# Patient Record
Sex: FEMALE | Race: ASIAN | NOT HISPANIC OR LATINO
[De-identification: names, ages, dates, MRNs, and addresses within clinical notes are randomized per-mention and may not be internally consistent; named-entity substitution may affect disease eponyms.]

---

## 2017-03-02 ENCOUNTER — APPOINTMENT (OUTPATIENT)
Dept: ENDOCRINOLOGY | Facility: CLINIC | Age: 22
End: 2017-03-02

## 2017-04-24 ENCOUNTER — MESSAGE (OUTPATIENT)
Age: 22
End: 2017-04-24

## 2017-04-26 ENCOUNTER — APPOINTMENT (OUTPATIENT)
Dept: DERMATOLOGY | Facility: CLINIC | Age: 22
End: 2017-04-26

## 2017-04-26 ENCOUNTER — APPOINTMENT (OUTPATIENT)
Dept: ENDOCRINOLOGY | Facility: CLINIC | Age: 22
End: 2017-04-26

## 2017-04-26 VITALS — SYSTOLIC BLOOD PRESSURE: 114 MMHG | DIASTOLIC BLOOD PRESSURE: 78 MMHG

## 2017-04-26 VITALS — HEART RATE: 71 BPM | OXYGEN SATURATION: 98 % | BODY MASS INDEX: 22.78 KG/M2 | WEIGHT: 116 LBS | HEIGHT: 60 IN

## 2017-04-26 VITALS — DIASTOLIC BLOOD PRESSURE: 80 MMHG | SYSTOLIC BLOOD PRESSURE: 122 MMHG

## 2017-04-26 DIAGNOSIS — G43.909 MIGRAINE, UNSPECIFIED, NOT INTRACTABLE, W/OUT STATUS MIGRAINOSUS: ICD-10-CM

## 2017-04-26 DIAGNOSIS — F41.8 OTHER SPECIFIED ANXIETY DISORDERS: ICD-10-CM

## 2017-05-04 ENCOUNTER — OTHER (OUTPATIENT)
Age: 22
End: 2017-05-04

## 2017-05-04 ENCOUNTER — APPOINTMENT (OUTPATIENT)
Dept: PHYSICAL MEDICINE AND REHAB | Facility: CLINIC | Age: 22
End: 2017-05-04

## 2017-05-04 VITALS
SYSTOLIC BLOOD PRESSURE: 105 MMHG | HEIGHT: 60 IN | OXYGEN SATURATION: 71 % | WEIGHT: 110 LBS | TEMPERATURE: 97.8 F | BODY MASS INDEX: 21.6 KG/M2 | HEART RATE: 71 BPM | DIASTOLIC BLOOD PRESSURE: 70 MMHG

## 2017-05-08 ENCOUNTER — TRANSCRIPTION ENCOUNTER (OUTPATIENT)
Age: 22
End: 2017-05-08

## 2017-05-30 ENCOUNTER — APPOINTMENT (OUTPATIENT)
Dept: DERMATOLOGY | Facility: CLINIC | Age: 22
End: 2017-05-30

## 2017-05-30 VITALS
SYSTOLIC BLOOD PRESSURE: 100 MMHG | DIASTOLIC BLOOD PRESSURE: 60 MMHG | HEIGHT: 60 IN | BODY MASS INDEX: 21.6 KG/M2 | WEIGHT: 110 LBS

## 2017-06-09 ENCOUNTER — APPOINTMENT (OUTPATIENT)
Dept: PHYSICAL MEDICINE AND REHAB | Facility: CLINIC | Age: 22
End: 2017-06-09

## 2017-07-11 ENCOUNTER — APPOINTMENT (OUTPATIENT)
Dept: DERMATOLOGY | Facility: CLINIC | Age: 22
End: 2017-07-11

## 2017-07-11 VITALS — SYSTOLIC BLOOD PRESSURE: 98 MMHG | DIASTOLIC BLOOD PRESSURE: 60 MMHG

## 2017-08-10 ENCOUNTER — RX RENEWAL (OUTPATIENT)
Age: 22
End: 2017-08-10

## 2017-08-22 ENCOUNTER — APPOINTMENT (OUTPATIENT)
Dept: DERMATOLOGY | Facility: CLINIC | Age: 22
End: 2017-08-22

## 2017-10-23 ENCOUNTER — APPOINTMENT (OUTPATIENT)
Dept: DERMATOLOGY | Facility: CLINIC | Age: 22
End: 2017-10-23

## 2017-12-23 ENCOUNTER — EMERGENCY (EMERGENCY)
Facility: HOSPITAL | Age: 22
LOS: 1 days | Discharge: HOME | End: 2017-12-23
Attending: EMERGENCY MEDICINE | Admitting: EMERGENCY MEDICINE
Payer: MEDICAID

## 2017-12-23 VITALS
RESPIRATION RATE: 17 BRPM | DIASTOLIC BLOOD PRESSURE: 84 MMHG | TEMPERATURE: 99 F | SYSTOLIC BLOOD PRESSURE: 122 MMHG | OXYGEN SATURATION: 100 % | HEART RATE: 66 BPM

## 2017-12-23 PROCEDURE — 99283 EMERGENCY DEPT VISIT LOW MDM: CPT

## 2017-12-23 RX ORDER — CHLORHEXIDINE GLUCONATE 213 G/1000ML
15 SOLUTION TOPICAL
Qty: 1 | Refills: 0 | OUTPATIENT
Start: 2017-12-23 | End: 2017-12-29

## 2017-12-23 RX ADMIN — Medication 1 TABLET(S): at 18:56

## 2017-12-23 NOTE — ED ADULT TRIAGE NOTE - CHIEF COMPLAINT QUOTE
Pt. c/o dog bite around 1pm today. Reports her dog bit her on the lower lip when she tried "giving her nose kisses". Pt. seen at Urgent care first, given tetanus shot and told to come to ER for possible sutures. No active bleeding noted.

## 2017-12-23 NOTE — ED PROVIDER NOTE - ATTENDING CONTRIBUTION TO CARE
MD Woodson:  patient seen and evaluated with the resident.  I was present for key portions of the History & Physical, and I agree with the Impression & Plan.  MD Woodson:  21 yo F, c/o lower lip lacerations from dog bite.  Dog belongs to family member and is vaccinated.  Location:  mucosal aspect of lip; she has no skin or vermillion border involvement.  VS - wnl.  Physical Exam: adult F, swollen lower lip, she has two vertically oriented mucosal-aspect lacerations that are not through-and-through.  No active bleeding.  Already received Tdap at urgent care.  Impression:  mucosal lip laceration that does not require sutures.  Plan:  peridex swish and spit, PO augmentin, return precautions.

## 2017-12-23 NOTE — ED PROVIDER NOTE - PLAN OF CARE
You were seen in the Emergency Department for a dog bite.   1) Advance activity as tolerated.    2) Continue all previously prescribed medications as directed.   your prescription of antibiotics and take as directed. Complete the full course of antibiotics. Use the swish and spit mouthwash twice a day for 7 days.  3) Follow up with your primary care physician in 24-48 hours - take copies of your results.    4) Return to the Emergency Department for worsening or persistent symptoms, and/or ANY NEW OR CONCERNING SYMPTOMS. If you have issues obtaining follow up, please call: [phone] to obtain a doctor or specialist who takes your insurance in your area.

## 2017-12-23 NOTE — ED PROVIDER NOTE - PHYSICAL EXAMINATION
Gen: AAOx3, non-toxic  Head: NCAT  HEENT: EOMI, oral mucosa moist, normal conjunctiva  Lung: CTAB, no respiratory distress, no wheezes/rhonchi/rales B/L, speaking in full sentences  CV: RRR, no murmurs, rubs or gallops  Abd: soft, NTND, no guarding  MSK: no visible deformities  Neuro: No focal sensory or motor deficits  Skin: Warm, well perfused, no rash, lower lip mucosal lacerations x 2, not actively bleeding, borders well approximated  Psych: normal affect.   ~Davidson Guadalupe M.D. Resident Gen: AAOx3, non-toxic  Head: NCAT  HEENT: EOMI, oral mucosa moist, normal conjunctiva  Lung: CTAB, no respiratory distress, no wheezes/rhonchi/rales B/L, speaking in full sentences  CV: RRR, no murmurs, rubs or gallops  Abd: soft, NTND, no guarding  MSK: no visible deformities  Neuro: No focal sensory or motor deficits  Skin: Warm, well perfused, no rash, lower lip mucosal lacerations x 2, not actively bleeding, borders well approximated, abrasion to chin  Psych: normal affect.   ~Davidson Guadalupe M.D. Resident

## 2017-12-23 NOTE — ED PROVIDER NOTE - NS ED ROS FT
GENERAL: No fever or chills, EYES: no change in vision, HEENT: no trouble swallowing or speaking, CARDIAC: no chest pain, PULMONARY: no cough or SOB, GI: no abdominal pain, no nausea, no vomiting, no diarrhea or constipation, : No changes in urination, SKIN: lower lip laceration, NEURO: no headache,  MSK: No joint pain ~Davidson Guadalupe M.D. Resident

## 2017-12-23 NOTE — ED PROVIDER NOTE - CARE PLAN
Principal Discharge DX:	Dog bite  Instructions for follow-up, activity and diet:	You were seen in the Emergency Department for a dog bite.   1) Advance activity as tolerated.    2) Continue all previously prescribed medications as directed.   your prescription of antibiotics and take as directed. Complete the full course of antibiotics. Use the swish and spit mouthwash twice a day for 7 days.  3) Follow up with your primary care physician in 24-48 hours - take copies of your results.    4) Return to the Emergency Department for worsening or persistent symptoms, and/or ANY NEW OR CONCERNING SYMPTOMS. If you have issues obtaining follow up, please call: [phone] to obtain a doctor or specialist who takes your insurance in your area.

## 2017-12-23 NOTE — ED PROVIDER NOTE - OBJECTIVE STATEMENT
21 yo F 23 yo F PMHx ALL p/w dog bite to lower lip around 1 pm. She was seen at Urgent Care and sent to the ER for possible suture repair. Her tetanus was updated at the Urgent Care Clinic. Bleeding has since stopped. Dog belongs to the family and is updated on all vaccines.

## 2018-01-29 ENCOUNTER — APPOINTMENT (OUTPATIENT)
Dept: DERMATOLOGY | Facility: CLINIC | Age: 23
End: 2018-01-29
Payer: MEDICAID

## 2018-01-29 ENCOUNTER — APPOINTMENT (OUTPATIENT)
Dept: PEDIATRIC HEMATOLOGY/ONCOLOGY | Facility: CLINIC | Age: 23
End: 2018-01-29
Payer: MEDICAID

## 2018-01-29 VITALS
BODY MASS INDEX: 23.67 KG/M2 | HEART RATE: 106 BPM | SYSTOLIC BLOOD PRESSURE: 105 MMHG | DIASTOLIC BLOOD PRESSURE: 70 MMHG | WEIGHT: 126.99 LBS | HEIGHT: 61.3 IN

## 2018-01-29 VITALS — SYSTOLIC BLOOD PRESSURE: 112 MMHG | DIASTOLIC BLOOD PRESSURE: 58 MMHG

## 2018-01-29 DIAGNOSIS — Z86.39 PERSONAL HISTORY OF OTHER ENDOCRINE, NUTRITIONAL AND METABOLIC DISEASE: ICD-10-CM

## 2018-01-29 DIAGNOSIS — Z85.9 PERSONAL HISTORY OF MALIGNANT NEOPLASM, UNSPECIFIED: ICD-10-CM

## 2018-01-29 DIAGNOSIS — E55.9 VITAMIN D DEFICIENCY, UNSPECIFIED: ICD-10-CM

## 2018-01-29 PROCEDURE — 11901 INJECT SKIN LESIONS >7: CPT

## 2018-01-29 PROCEDURE — 99215 OFFICE O/P EST HI 40 MIN: CPT

## 2018-01-29 RX ORDER — UBIDECARENONE/VIT E ACET 100MG-5
1000 CAPSULE ORAL DAILY
Refills: 0 | Status: DISCONTINUED | COMMUNITY
End: 2018-01-29

## 2018-01-29 RX ORDER — MELOXICAM 7.5 MG/1
7.5 TABLET ORAL
Qty: 30 | Refills: 0 | Status: DISCONTINUED | COMMUNITY
Start: 2017-05-04 | End: 2018-01-29

## 2018-01-29 RX ORDER — LEVOTHYROXINE SODIUM 0.1 MG/1
100 TABLET ORAL
Qty: 30 | Refills: 5 | Status: DISCONTINUED | COMMUNITY
Start: 2017-04-26 | End: 2018-01-29

## 2018-02-02 PROBLEM — Z86.39 HISTORY OF DIABETES MELLITUS: Status: RESOLVED | Noted: 2017-04-26 | Resolved: 2018-02-02

## 2018-02-02 PROBLEM — Z85.9 HISTORY OF MALIGNANT NEOPLASM: Status: RESOLVED | Noted: 2017-04-26 | Resolved: 2018-02-02

## 2018-02-02 PROBLEM — E55.9 VITAMIN D INSUFFICIENCY: Status: ACTIVE | Noted: 2018-01-29

## 2018-02-02 PROBLEM — Z86.39 HISTORY OF THYROID DISORDER: Status: RESOLVED | Noted: 2017-04-26 | Resolved: 2018-02-02

## 2018-04-07 ENCOUNTER — APPOINTMENT (OUTPATIENT)
Dept: DERMATOLOGY | Facility: CLINIC | Age: 23
End: 2018-04-07
Payer: MEDICAID

## 2018-04-07 VITALS — SYSTOLIC BLOOD PRESSURE: 100 MMHG | DIASTOLIC BLOOD PRESSURE: 64 MMHG

## 2018-04-07 PROCEDURE — 11901 INJECT SKIN LESIONS >7: CPT

## 2018-05-24 ENCOUNTER — RESULT REVIEW (OUTPATIENT)
Age: 23
End: 2018-05-24

## 2018-06-02 ENCOUNTER — APPOINTMENT (OUTPATIENT)
Dept: DERMATOLOGY | Facility: CLINIC | Age: 23
End: 2018-06-02
Payer: MEDICAID

## 2018-06-02 VITALS — SYSTOLIC BLOOD PRESSURE: 100 MMHG | DIASTOLIC BLOOD PRESSURE: 62 MMHG

## 2018-06-02 PROCEDURE — 11900 INJECT SKIN LESIONS </W 7: CPT

## 2018-06-02 RX ORDER — LEVOTHYROXINE SODIUM 112 UG/1
112 TABLET ORAL
Qty: 30 | Refills: 0 | Status: ACTIVE | COMMUNITY
Start: 2017-12-19

## 2019-10-02 ENCOUNTER — APPOINTMENT (OUTPATIENT)
Dept: PEDIATRIC HEMATOLOGY/ONCOLOGY | Facility: CLINIC | Age: 24
End: 2019-10-02
Payer: MEDICAID

## 2019-10-02 VITALS
BODY MASS INDEX: 25.27 KG/M2 | HEIGHT: 61.3 IN | HEART RATE: 69 BPM | SYSTOLIC BLOOD PRESSURE: 103 MMHG | WEIGHT: 135.58 LBS | DIASTOLIC BLOOD PRESSURE: 69 MMHG | TEMPERATURE: 98 F

## 2019-10-02 DIAGNOSIS — J30.2 OTHER SEASONAL ALLERGIC RHINITIS: ICD-10-CM

## 2019-10-02 DIAGNOSIS — Z78.9 OTHER SPECIFIED HEALTH STATUS: ICD-10-CM

## 2019-10-02 DIAGNOSIS — F12.90 CANNABIS USE, UNSPECIFIED, UNCOMPLICATED: ICD-10-CM

## 2019-10-02 DIAGNOSIS — L65.9 NONSCARRING HAIR LOSS, UNSPECIFIED: ICD-10-CM

## 2019-10-02 DIAGNOSIS — Z92.21 PERSONAL HISTORY OF ANTINEOPLASTIC CHEMOTHERAPY: ICD-10-CM

## 2019-10-02 DIAGNOSIS — Z87.2 PERSONAL HISTORY OF DISEASES OF THE SKIN AND SUBCUTANEOUS TISSUE: ICD-10-CM

## 2019-10-02 DIAGNOSIS — Z91.89 OTHER SPECIFIED PERSONAL RISK FACTORS, NOT ELSEWHERE CLASSIFIED: ICD-10-CM

## 2019-10-02 DIAGNOSIS — E89.0 POSTPROCEDURAL HYPOTHYROIDISM: ICD-10-CM

## 2019-10-02 DIAGNOSIS — Z08 ENCOUNTER FOR FOLLOW-UP EXAMINATION AFTER COMPLETED TREATMENT FOR MALIGNANT NEOPLASM: ICD-10-CM

## 2019-10-02 DIAGNOSIS — M25.531 PAIN IN RIGHT WRIST: ICD-10-CM

## 2019-10-02 DIAGNOSIS — Z85.6 PERSONAL HISTORY OF LEUKEMIA: ICD-10-CM

## 2019-10-02 PROCEDURE — 99215 OFFICE O/P EST HI 40 MIN: CPT

## 2019-10-02 RX ORDER — KETOTIFEN FUMARATE 0.35 MG/ML
0.03 SOLUTION/ DROPS OPHTHALMIC
Qty: 5 | Refills: 0 | Status: DISCONTINUED | COMMUNITY
Start: 2017-04-24 | End: 2019-10-02

## 2019-10-02 RX ORDER — ALENDRONATE SODIUM 70 MG/1
70 TABLET ORAL
Qty: 4 | Refills: 0 | Status: DISCONTINUED | COMMUNITY
Start: 2018-05-29 | End: 2019-10-02

## 2019-10-02 RX ORDER — DOCOSAHEXAENOIC ACID 300 MG
50 MCG CAPSULE ORAL
Qty: 30 | Refills: 0 | Status: DISCONTINUED | COMMUNITY
Start: 2018-04-26 | End: 2019-10-02

## 2019-10-02 RX ORDER — ERGOCALCIFEROL (VITAMIN D2) 1250 MCG
50000 CAPSULE ORAL
Refills: 0 | Status: DISCONTINUED | COMMUNITY
End: 2019-10-02

## 2019-10-02 RX ORDER — LEVOTHYROXINE SODIUM 137 UG/1
137 TABLET ORAL DAILY
Refills: 0 | Status: DISCONTINUED | COMMUNITY
End: 2019-10-02

## 2019-10-02 RX ORDER — FLUTICASONE PROPIONATE 50 UG/1
50 SPRAY, METERED NASAL
Qty: 16 | Refills: 0 | Status: DISCONTINUED | COMMUNITY
Start: 2018-03-01 | End: 2019-10-02

## 2019-10-02 RX ORDER — AMOXICILLIN AND CLAVULANATE POTASSIUM 875; 125 MG/1; MG/1
875-125 TABLET, COATED ORAL
Qty: 14 | Refills: 0 | Status: DISCONTINUED | COMMUNITY
Start: 2017-12-24 | End: 2019-10-02

## 2019-10-02 RX ORDER — CHLORHEXIDINE GLUCONATE, 0.12% ORAL RINSE 1.2 MG/ML
0.12 SOLUTION DENTAL
Qty: 237 | Refills: 0 | Status: DISCONTINUED | COMMUNITY
Start: 2017-12-24 | End: 2019-10-02

## 2019-10-02 RX ORDER — LORATADINE 10 MG/1
10 TABLET ORAL DAILY
Refills: 0 | Status: ACTIVE | COMMUNITY
Start: 2018-03-19

## 2019-10-03 ENCOUNTER — CLINICAL ADVICE (OUTPATIENT)
Age: 24
End: 2019-10-03

## 2019-10-03 PROBLEM — Z87.2 HISTORY OF ALOPECIA AREATA: Status: RESOLVED | Noted: 2017-04-26 | Resolved: 2019-10-03

## 2019-10-03 PROBLEM — M25.531 RIGHT WRIST PAIN: Status: ACTIVE | Noted: 2017-05-04

## 2019-10-03 PROBLEM — E89.0 POSTABLATIVE HYPOTHYROIDISM: Status: ACTIVE | Noted: 2018-02-02

## 2019-10-03 PROBLEM — L65.9 ALOPECIA OF SCALP: Status: RESOLVED | Noted: 2018-04-07 | Resolved: 2019-10-03

## 2019-10-03 NOTE — SOCIAL HISTORY
[Mother] : mother [Father] : father [Brother] : brother [College] : College [Sexually Active] : sexually active [Number of Partners ___] : with [unfilled]  partner(s)  [Condoms] : condoms

## 2019-10-03 NOTE — PHYSICAL EXAM
[Gait normal] : gait normal [No focal deficits] : no focal deficits [Motor Exam nomal] : motor exam normal [Normal] : affect appropriate [100: Normal, no complaints, no evidence of disease.] : 100: Normal, no complaints, no evidence of disease. [de-identified] : No obvious cataracts

## 2019-10-03 NOTE — PAST MEDICAL HISTORY
[FreeTextEntry2] : Recently irregular, missed 1 month [Regular Cycle Intervals] : periods have been irregular

## 2019-10-03 NOTE — REASON FOR VISIT
[Follow-Up Visit] : a follow-up visit  [Last Survivorship Appointment: ________] : The last survivorship appointment was [unfilled] [Other: _____] : [unfilled]

## 2019-10-03 NOTE — HISTORY OF PRESENT ILLNESS
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 Since her last visit in the survivorship program, Lorrie has been generally well without visits to the emergency room, hospitalizations or surgeries. She has not had fever, weight loss or pain. 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 She graduated from [employer] with a degree in [employer] science. Lorrie reported having a generally healthy diet. She has been getting exercise attending the gym twice weekly, mostly working out with weights, and running 1-2 times per week.  [de-identified] : 75 mg/m2 [de-identified] : 100 mg/m2 [de-identified] : 3,000 mg/m2 [de-identified] : Yes. [de-identified] : Yes. [de-identified] : Yes. [de-identified] : Yes. [de-identified] : Yes. [de-identified] : Yes. [de-identified] : Yes. [de-identified] : Yes. [de-identified] : Yes.

## 2019-10-03 NOTE — CONSULT LETTER
[Dear  ___] : Dear  [unfilled], [Courtesy Letter:] : I had the pleasure of seeing your patient, [unfilled], in my office today. [Please see my note below.] : Please see my note below. [Consult Closing:] : Thank you very much for allowing me to participate in the care of this patient.  If you have any questions, please do not hesitate to contact me. [Sincerely,] : Sincerely, [DrMauricio  ___] : Dr. MAI [FreeTextEntry2] : Armin Thacker MD  \par [address] \par [place] [address]\par [phone] office  [phone] fax\par  [FreeTextEntry3] : Fabio Mahoney MD\par  of Pediatrics\par [facility]\par [place], Survivors Facing Forward Program\par Hematology / Oncology and Stem Cell Transplantation\par [facility]\par [email or web address]\par [email or web address]\par [phone]\par \par  \par \par Visit us at [facility] <[email or web address]\par

## 2019-10-07 ENCOUNTER — APPOINTMENT (OUTPATIENT)
Dept: DERMATOLOGY | Facility: CLINIC | Age: 24
End: 2019-10-07

## 2019-10-15 ENCOUNTER — CLINICAL ADVICE (OUTPATIENT)
Age: 24
End: 2019-10-15

## 2019-10-17 ENCOUNTER — APPOINTMENT (OUTPATIENT)
Dept: ORTHOPEDIC SURGERY | Facility: CLINIC | Age: 24
End: 2019-10-17
Payer: MEDICAID

## 2019-10-17 VITALS — DIASTOLIC BLOOD PRESSURE: 71 MMHG | SYSTOLIC BLOOD PRESSURE: 109 MMHG | HEART RATE: 82 BPM

## 2019-10-17 DIAGNOSIS — M87.00 IDIOPATHIC ASEPTIC NECROSIS OF UNSPECIFIED BONE: ICD-10-CM

## 2019-10-17 PROCEDURE — 99203 OFFICE O/P NEW LOW 30 MIN: CPT

## 2019-12-27 ENCOUNTER — TRANSCRIPTION ENCOUNTER (OUTPATIENT)
Age: 24
End: 2019-12-27

## 2020-04-30 ENCOUNTER — APPOINTMENT (OUTPATIENT)
Dept: ORTHOPEDIC SURGERY | Facility: CLINIC | Age: 25
End: 2020-04-30

## 2020-09-20 ENCOUNTER — TRANSCRIPTION ENCOUNTER (OUTPATIENT)
Age: 25
End: 2020-09-20

## 2020-10-14 ENCOUNTER — TRANSCRIPTION ENCOUNTER (OUTPATIENT)
Age: 25
End: 2020-10-14

## 2020-12-07 ENCOUNTER — TRANSCRIPTION ENCOUNTER (OUTPATIENT)
Age: 25
End: 2020-12-07

## 2021-03-23 ENCOUNTER — TRANSCRIPTION ENCOUNTER (OUTPATIENT)
Age: 26
End: 2021-03-23

## 2021-05-10 ENCOUNTER — APPOINTMENT (OUTPATIENT)
Dept: PEDIATRIC HEMATOLOGY/ONCOLOGY | Facility: CLINIC | Age: 26
End: 2021-05-10

## 2021-09-01 ENCOUNTER — TRANSCRIPTION ENCOUNTER (OUTPATIENT)
Age: 26
End: 2021-09-01

## 2021-11-17 ENCOUNTER — TRANSCRIPTION ENCOUNTER (OUTPATIENT)
Age: 26
End: 2021-11-17

## 2021-12-15 ENCOUNTER — TRANSCRIPTION ENCOUNTER (OUTPATIENT)
Age: 26
End: 2021-12-15

## 2022-02-14 ENCOUNTER — TRANSCRIPTION ENCOUNTER (OUTPATIENT)
Age: 27
End: 2022-02-14

## 2022-04-15 ENCOUNTER — TRANSCRIPTION ENCOUNTER (OUTPATIENT)
Age: 27
End: 2022-04-15

## 2023-10-29 ENCOUNTER — NON-APPOINTMENT (OUTPATIENT)
Age: 28
End: 2023-10-29

## 2023-11-06 NOTE — ED PROVIDER NOTE - MEDICAL DECISION MAKING DETAILS
21 yo F PMHx ALL p/w dog bite to lower lip around 1 pm with 2 linear mucosal lacerations to bottom lip, do not require sutures, will antibose and discharge home with return precautions. no

## 2023-12-21 ENCOUNTER — NON-APPOINTMENT (OUTPATIENT)
Age: 28
End: 2023-12-21

## 2024-04-29 ENCOUNTER — NON-APPOINTMENT (OUTPATIENT)
Age: 29
End: 2024-04-29

## 2024-06-19 ENCOUNTER — NON-APPOINTMENT (OUTPATIENT)
Age: 29
End: 2024-06-19